# Patient Record
Sex: FEMALE | Race: OTHER | HISPANIC OR LATINO | URBAN - METROPOLITAN AREA
[De-identification: names, ages, dates, MRNs, and addresses within clinical notes are randomized per-mention and may not be internally consistent; named-entity substitution may affect disease eponyms.]

---

## 2022-07-10 ENCOUNTER — EMERGENCY (EMERGENCY)
Facility: HOSPITAL | Age: 22
LOS: 1 days | Discharge: STILL ADMITTED OR ADMINISTRATIVE | End: 2022-07-10
Admitting: EMERGENCY MEDICINE

## 2022-07-10 PROCEDURE — L9996: CPT

## 2022-07-11 ENCOUNTER — OUTPATIENT (OUTPATIENT)
Dept: INPATIENT UNIT | Facility: HOSPITAL | Age: 22
LOS: 1 days | Discharge: HOME | End: 2022-07-11

## 2022-07-11 VITALS
TEMPERATURE: 99 F | RESPIRATION RATE: 16 BRPM | DIASTOLIC BLOOD PRESSURE: 64 MMHG | HEART RATE: 93 BPM | SYSTOLIC BLOOD PRESSURE: 113 MMHG

## 2022-07-11 VITALS — SYSTOLIC BLOOD PRESSURE: 101 MMHG | DIASTOLIC BLOOD PRESSURE: 62 MMHG | HEART RATE: 76 BPM

## 2022-07-11 VITALS
OXYGEN SATURATION: 100 % | TEMPERATURE: 98 F | DIASTOLIC BLOOD PRESSURE: 69 MMHG | RESPIRATION RATE: 18 BRPM | SYSTOLIC BLOOD PRESSURE: 124 MMHG | HEART RATE: 99 BPM

## 2022-07-11 DIAGNOSIS — Z3A.00 WEEKS OF GESTATION OF PREGNANCY NOT SPECIFIED: ICD-10-CM

## 2022-07-11 DIAGNOSIS — O26.899 OTHER SPECIFIED PREGNANCY RELATED CONDITIONS, UNSPECIFIED TRIMESTER: ICD-10-CM

## 2022-07-11 DIAGNOSIS — O44.00 COMPLETE PLACENTA PREVIA NOS OR WITHOUT HEMORRHAGE, UNSPECIFIED TRIMESTER: ICD-10-CM

## 2022-07-11 LAB
APPEARANCE UR: ABNORMAL
BACTERIA # UR AUTO: ABNORMAL
BILIRUB UR-MCNC: NEGATIVE — SIGNIFICANT CHANGE UP
BLD GP AB SCN SERPL QL: NEGATIVE — SIGNIFICANT CHANGE UP
COLOR SPEC: SIGNIFICANT CHANGE UP
DIFF PNL FLD: ABNORMAL
EPI CELLS # UR: 5 /HPF — SIGNIFICANT CHANGE UP (ref 0–5)
GLUCOSE UR QL: NEGATIVE — SIGNIFICANT CHANGE UP
HYALINE CASTS # UR AUTO: 3 /LPF — SIGNIFICANT CHANGE UP (ref 0–7)
KETONES UR-MCNC: NEGATIVE — SIGNIFICANT CHANGE UP
LEUKOCYTE ESTERASE UR-ACNC: ABNORMAL
NITRITE UR-MCNC: NEGATIVE — SIGNIFICANT CHANGE UP
PH UR: 7 — SIGNIFICANT CHANGE UP (ref 5–8)
PROT UR-MCNC: ABNORMAL
RBC CASTS # UR COMP ASSIST: 7 /HPF — HIGH (ref 0–4)
RH IG SCN BLD-IMP: POSITIVE — SIGNIFICANT CHANGE UP
SP GR SPEC: 1.02 — SIGNIFICANT CHANGE UP (ref 1–1.05)
UROBILINOGEN FLD QL: SIGNIFICANT CHANGE UP
WBC UR QL: 49 /HPF — HIGH (ref 0–5)

## 2022-07-11 PROCEDURE — 76819 FETAL BIOPHYS PROFIL W/O NST: CPT | Mod: 26

## 2022-07-11 RX ORDER — FLUCONAZOLE 150 MG/1
1 TABLET ORAL
Qty: 1 | Refills: 0
Start: 2022-07-11 | End: 2022-07-11

## 2022-07-11 RX ORDER — NITROFURANTOIN MACROCRYSTAL 50 MG
1 CAPSULE ORAL
Qty: 10 | Refills: 0
Start: 2022-07-11 | End: 2022-07-15

## 2022-07-11 NOTE — OB PROVIDER TRIAGE NOTE - NSHPLABSRESULTS_GEN_ALL_CORE
Urinalysis Basic - ( 2022 01:25 )    Color: Light Yellow / Appearance: Slightly Turbid / S.021 / pH: x  Gluc: x / Ketone: Negative  / Bili: Negative / Urobili: <2 mg/dL   Blood: x / Protein: Trace / Nitrite: Negative   Leuk Esterase: Large / RBC: 7 /HPF / WBC 49 /HPF   Sq Epi: x / Non Sq Epi: 5 /HPF / Bacteria: Moderate    Blood type - O+

## 2022-07-11 NOTE — OB PROVIDER TRIAGE NOTE - PLAN OF CARE
d/w Dr Small: prescribe macrobid, prescribe diflucan x 1, ordered/sent type and screen - O+  follow up with OB provider as scheduled, Anatomy scan on Thursday.  Discussed bedrest and pelvic rest with pt - she verbalized understanding not to put anything in vagina.  reviewed  labor precautions and discharge papers- pt verbalized understanding and signed  increase fluid hydration  call OB provider/return to triage with concerns and or change in symptoms such as decreased FM, LOF, bleeding, contractions, pain d/w Dr Small: prescribe macrobid, prescribe diflucan x 1, ordered/sent type and screen - O+  follow up with OB provider as scheduled. Anatomy scan on Thursday.  Discussed bedrest and pelvic rest with pt - she verbalized understanding not to put anything in vagina.  reviewed  labor precautions and discharge papers- pt verbalized understanding and signed  increase fluid hydration  call OB provider/return to triage with concerns and or change in symptoms such as decreased FM, LOF, bleeding, contractions, pain

## 2022-07-11 NOTE — OB PROVIDER TRIAGE NOTE - HISTORY OF PRESENT ILLNESS
22 y/o female  at 20.3GA with SLIUP, PNC complicated by UTI in 2022, presents to triage c/o urinary bleeding and frequency since 23:00. She states she saw bright red blood in the toilet x 2 when she urinated and it is associated with low pelvic pressure. She explains she had similar symptoms 1 month ago and she presented to [facility] where she was diagnosed with UTI and given keflex.  Pt reports GFM. Pt denies LOF, VB, contractions, back pain, flank pain, recent intercourse.  Last PNC visit - 22, WNL  Pt scheduled for anatomy scan this week    med hx: denies  surg hx: denies   Ob: denies  Gyn: denies hx fibroids, cysts, abnormal pap smears, STDs, HSV  Allergies: NKDA  Medications: PNV, iron  Psych: denies  Social: denies alc/drugs/tobacco     20 y/o female  at 20.3GA with SLIUP, PNC complicated by UTI in 2022, presents to triage c/o urinary bleeding and frequency since 23:00. She states she saw bright red blood in the toilet x 2 when she urinated and it is associated with low pelvic pressure. She explains she had similar symptoms 1 month ago and she presented to [facility] where she was diagnosed with UTI and given keflex. - her symptoms resolved.  Pt reports GFM. Pt denies LOF, VB, contractions, back pain, flank pain, recent intercourse.  Last PNC visit - 22, WNL  Pt scheduled for anatomy scan this week    med hx: denies  surg hx: denies   Ob: denies  Gyn: denies hx fibroids, cysts, abnormal pap smears, STDs, HSV  Allergies: NKDA  Medications: PNV, iron  Psych: denies  Social: denies alc/drugs/tobacco     22 y/o female  at 20.3GA with SLIUP, PNC complicated by UTI in 2022, presents to triage c/o urinary bleeding and frequency since 23:00. She states she saw bright red blood in the toilet x 2 when she urinated and it is associated with low pelvic pressure. She explains she had similar symptoms 1 month ago and she presented to [facility] where she was diagnosed with UTI and given keflex - her symptoms resolved.  Pt reports GFM. Pt denies LOF, VB, contractions, back pain, flank pain, recent intercourse.  Last PNC visit - 22, WNL. Next PNC visit - 22  Pt scheduled for anatomy scan this week    med hx: denies  surg hx: denies   Ob: denies  Gyn: denies hx fibroids, cysts, abnormal pap smears, STDs, HSV  Allergies: NKDA  Medications: PNV, iron  Psych: denies  Social: denies alc/drugs/tobacco

## 2022-07-11 NOTE — OB PROVIDER TRIAGE NOTE - ADDITIONAL INSTRUCTIONS
follow up with OB provider as scheduled and Anatomy scan on Thursday.  macrobid x 5 days and diflucan x once sent to [facility] pharmacy  Bedrest and pelvic rest with pt until further discussed with OB provider- pt verbalized understanding.  reviewed  labor precautions  increase fluid hydration  call OB provider/return to triage with concerns and or change in symptoms such as decreased FM, LOF, bleeding, contractions, pain follow up with OB provider as scheduled and Anatomy scan on Thursday.  macrobid x 5 days and diflucan x once sent to [facility] pharmacy  Bedrest and pelvic rest with pt until further discussed with OB provider - Nothing in vagina until further discussed with OB provider.   reviewed  labor precautions  increase fluid hydration  call OB provider/return to triage with concerns and or change in symptoms such as decreased FM, LOF, bleeding, contractions, pain

## 2022-07-11 NOTE — OB PROVIDER TRIAGE NOTE - NSOBPROVIDERNOTE_OBGYN_ALL_OB_FT
22 y/o female  at 20.3GA with SLIUP, PNC complicated by UTI in 2022, presents to triage c/o urinary bleeding and frequency since 23:00. 22 y/o female  at 20.3GA with SLIUP, PNC complicated by UTI in 2022, presents to triage c/o urinary bleeding and frequency since 23:00.    PLAN: UA, TAS, SSE, SVE    TAS: GFM, , good fluid; Q2 - 2.85 x 3.73. placenta previa - performed by Dr Small and Jayshree MARRERO  SSE: performed by LORI MARRERO and Dr. Small. dark red blood, yeast-like discharge.  SVE: performed by Dr. Small. long/closed  TOCO: non-armando. mild irritability.    Urinalysis Basic - ( 2022 01:25 )    Color: Light Yellow / Appearance: Slightly Turbid / S.021 / pH: x  Gluc: x / Ketone: Negative  / Bili: Negative / Urobili: <2 mg/dL   Blood: x / Protein: Trace / Nitrite: Negative   Leuk Esterase: Large / RBC: 7 /HPF / WBC 49 /HPF   Sq Epi: x / Non Sq Epi: 5 /HPF / Bacteria: Moderate    ordered/sent Urine cx    Dr Small evaluated Pt and discussed with pt UTI, yeast infection, and placenta previa    d/w Dr Small: prescribe macrobid, prescribe diflucan x 1, ordered/sent type and screen - O+  follow up with OB provider as scheduled. Anatomy scan on Thursday.  Discussed bedrest and pelvic rest with pt - she verbalized understanding.  reviewed  labor precautions and discharge papers- pt verbalized understanding and signed  increase fluid hydration  call OB provider/return to triage with concerns and or change in symptoms such as decreased FM, LOF, bleeding, contractions, pain 22 y/o female  at 20.3GA with SLIUP, PNC complicated by UTI in 2022, presents to triage c/o urinary bleeding and frequency since 23:00.    PLAN: UA, TAS, SSE, SVE    TAS: GFM, , good fluid; Q2 - 2.85 x 3.73. placenta previa - performed by Dr Small and Jayshree MARRERO  SSE: performed by LORI MARRERO and Dr. Small. dark red blood, yeast-like discharge.  SVE: performed by Dr. Small. long/closed  TOCO: non-armando. mild irritability.    Urinalysis Basic - ( 2022 01:25 )    Color: Light Yellow / Appearance: Slightly Turbid / S.021 / pH: x  Gluc: x / Ketone: Negative  / Bili: Negative / Urobili: <2 mg/dL   Blood: x / Protein: Trace / Nitrite: Negative   Leuk Esterase: Large / RBC: 7 /HPF / WBC 49 /HPF   Sq Epi: x / Non Sq Epi: 5 /HPF / Bacteria: Moderate    ordered/sent Urine cx    Dr Small evaluated Pt and discussed with pt UTI, yeast infection, and placenta previa    d/w Dr Small: prescribe macrobid, prescribe diflucan x 1, ordered/sent type and screen - O+  follow up with OB provider as scheduled. Anatomy scan on Thursday.  Discussed bedrest and pelvic rest with pt - she verbalized understanding not to put anything in vagina.  reviewed  labor precautions and discharge papers- pt verbalized understanding and signed  increase fluid hydration  call OB provider/return to triage with concerns and or change in symptoms such as decreased FM, LOF, bleeding, contractions, pain

## 2022-07-11 NOTE — OB RN TRIAGE NOTE - CHIEF COMPLAINT QUOTE
"I am having blood in my urine when I go to the bathroom and increased urge to urinate and painful urination"

## 2022-07-11 NOTE — OB PROVIDER TRIAGE NOTE - NSHPPHYSICALEXAM_GEN_ALL_CORE
Vital Signs Last 24 Hrs  T(C): 37 (11 Jul 2022 00:44), Max: 37.0 (11 Jul 2022 00:42)  T(F): 98.6 (11 Jul 2022 00:44), Max: 98.6 (11 Jul 2022 00:42)  HR: 93 (11 Jul 2022 00:44) (93 - 99)  BP: 113/64 (11 Jul 2022 00:44) (113/64 - 124/69)  BP(mean): --  RR: 16 (11 Jul 2022 00:44) (16 - 18)  SpO2: 100% (11 Jul 2022 00:03) (100% - 100%)    gen: a/o x 3, comfortable  pulm: CTA b/l  cardio: RRR  abd: soft and mildy tender at low pelvic area, gravid  SSE: performed by Dr. Small. dark red blood, yeast-like discharge.  SVE: performed by Dr. Small. closed/long  TAS: GFM, , good fluid. Q2 - 2.85 x 3.73. placenta previa - performed by Dr Small and Jayshree MARRERO  EFM: deferred  TOCO: non-armando. mild irritability. Vital Signs Last 24 Hrs  T(C): 37 (11 Jul 2022 00:44), Max: 37.0 (11 Jul 2022 00:42)  T(F): 98.6 (11 Jul 2022 00:44), Max: 98.6 (11 Jul 2022 00:42)  HR: 93 (11 Jul 2022 00:44) (93 - 99)  BP: 113/64 (11 Jul 2022 00:44) (113/64 - 124/69)  BP(mean): --  RR: 16 (11 Jul 2022 00:44) (16 - 18)  SpO2: 100% (11 Jul 2022 00:03) (100% - 100%)    gen: a/o x 3, comfortable  pulm: CTA b/l  cardio: RRR  abd: soft and mildy tender at low pelvic area, gravid  SSE: performed by LORI MARRERO and Dr. Small. dark red/brown blood, yeast-like discharge.  SVE: performed by Dr. Small. closed/long  TAS: GFM, , good fluid; Q2 - 2.85 x 3.73. placenta previa - performed by Dr Small and Jayshree MARRERO  EFM: deferred  TOCO: non-armando. mild irritability.

## 2022-07-12 LAB
CULTURE RESULTS: SIGNIFICANT CHANGE UP
SPECIMEN SOURCE: SIGNIFICANT CHANGE UP

## 2022-11-10 ENCOUNTER — OUTPATIENT (OUTPATIENT)
Dept: INPATIENT UNIT | Facility: HOSPITAL | Age: 22
LOS: 1 days | Discharge: HOME | End: 2022-11-10

## 2022-11-10 VITALS
DIASTOLIC BLOOD PRESSURE: 77 MMHG | RESPIRATION RATE: 17 BRPM | TEMPERATURE: 98 F | HEART RATE: 95 BPM | SYSTOLIC BLOOD PRESSURE: 139 MMHG

## 2022-11-10 VITALS — DIASTOLIC BLOOD PRESSURE: 63 MMHG | HEART RATE: 85 BPM | SYSTOLIC BLOOD PRESSURE: 129 MMHG

## 2022-11-10 DIAGNOSIS — Z3A.00 WEEKS OF GESTATION OF PREGNANCY NOT SPECIFIED: ICD-10-CM

## 2022-11-10 DIAGNOSIS — O26.899 OTHER SPECIFIED PREGNANCY RELATED CONDITIONS, UNSPECIFIED TRIMESTER: ICD-10-CM

## 2022-11-10 PROCEDURE — 76818 FETAL BIOPHYS PROFILE W/NST: CPT | Mod: 26

## 2022-11-10 PROCEDURE — 99214 OFFICE O/P EST MOD 30 MIN: CPT

## 2022-11-10 NOTE — OB PROVIDER TRIAGE NOTE - HISTORY OF PRESENT ILLNESS
20y/o  @37.5wks presents with contractions since seeing Dr. Cherry this afternoon for a prenatal appointment, Pain scale 5/10  Reports good fetal movement  Denies LOF/VB    Allergies: Denies  Medications: PNV    Denies Medical and Surgical HX  Denies Etoh/Smoke/Drugs/Psy

## 2022-11-10 NOTE — OB PROVIDER TRIAGE NOTE - NSHPPHYSICALEXAM_GEN_ALL_CORE
Vital Signs Last 24 Hrs  T(C): 37.0 (10 Nov 2022 22:20), Max: 37.0 (10 Nov 2022 22:20)  T(F): 98.6 (10 Nov 2022 22:20), Max: 98.6 (10 Nov 2022 22:20)  HR: 86 (10 Nov 2022 22:23) (86 - 95)  BP: 121/65 (10 Nov 2022 22:23) (121/65 - 139/77)  RR: 17 (10 Nov 2022 21:55) (17 - 17)    Assessment reveals VSS  Abdomen soft, NT, gravid  Cat 1 tracing, irregular 3-7mins  Transabdominal Ultrasound- vtx, post placenta, bpp8/8, isabel: 15.37  Vaginal Exam- 0/30/-3  A&Ox3  Lungs- clear bilateral  Heart- normal rate and regular rhythm  Extremities- Warm, Dry, no edema present, good pulses       PLAN:  D/C Home

## 2022-11-10 NOTE — OB RN TRIAGE NOTE - FALL HARM RISK - UNIVERSAL INTERVENTIONS
Bed in lowest position, wheels locked, appropriate side rails in place/Call bell, personal items and telephone in reach/Instruct patient to call for assistance before getting out of bed or chair/Non-slip footwear when patient is out of bed/[place] to call system/Physically safe environment - no spills, clutter or unnecessary equipment/Purposeful Proactive Rounding/Room/bathroom lighting operational, light cord in reach

## 2022-11-10 NOTE — OB PROVIDER TRIAGE NOTE - ADDITIONAL INSTRUCTIONS
Follow up at next scheduled prenatal appointment 11/17/2022  Return for decreased fetal movement, loss of fluid or vaginal bleeding  Increase oral hydration  Signs and Symptoms of Labor Review

## 2022-11-10 NOTE — OB PROVIDER TRIAGE NOTE - PLAN OF CARE
D/C Home  D/W    No evidence of acute process  Normal fetal Testing  False Labor at 37 weeks  Follow up at next scheduled prenatal appointment 11/17/2022  Return for decreased fetal movement, loss of fluid or vaginal bleeding  Increase oral hydration  Signs and Symptoms of Labor Review D/C Home  D/W Dr. Willis  No evidence of acute process  Normal fetal Testing  False Labor at 37 weeks  Follow up at next scheduled prenatal appointment 11/17/2022  Return for decreased fetal movement, loss of fluid or vaginal bleeding  Increase oral hydration  Signs and Symptoms of Labor Review

## 2022-11-11 PROBLEM — Z78.9 OTHER SPECIFIED HEALTH STATUS: Chronic | Status: ACTIVE | Noted: 2022-07-11

## 2022-11-22 PROBLEM — Z00.00 ENCOUNTER FOR PREVENTIVE HEALTH EXAMINATION: Status: ACTIVE | Noted: 2022-11-22

## 2022-11-23 ENCOUNTER — APPOINTMENT (OUTPATIENT)
Dept: ANTEPARTUM | Facility: CLINIC | Age: 22
End: 2022-11-23

## 2022-11-23 ENCOUNTER — ASOB RESULT (OUTPATIENT)
Age: 22
End: 2022-11-23

## 2022-11-23 PROCEDURE — 76819 FETAL BIOPHYS PROFIL W/O NST: CPT | Mod: 59

## 2022-11-23 PROCEDURE — 76816 OB US FOLLOW-UP PER FETUS: CPT

## 2022-11-23 PROCEDURE — ZZZZZ: CPT

## 2022-11-28 ENCOUNTER — APPOINTMENT (OUTPATIENT)
Dept: ANTEPARTUM | Facility: CLINIC | Age: 22
End: 2022-11-28

## 2022-11-28 ENCOUNTER — ASOB RESULT (OUTPATIENT)
Age: 22
End: 2022-11-28

## 2022-11-28 PROCEDURE — 76818 FETAL BIOPHYS PROFILE W/NST: CPT

## 2022-11-30 ENCOUNTER — APPOINTMENT (OUTPATIENT)
Dept: ANTEPARTUM | Facility: CLINIC | Age: 22
End: 2022-11-30

## 2022-11-30 ENCOUNTER — INPATIENT (INPATIENT)
Facility: HOSPITAL | Age: 22
LOS: 1 days | Discharge: HOME | End: 2022-12-02
Attending: OBSTETRICS & GYNECOLOGY | Admitting: OBSTETRICS & GYNECOLOGY

## 2022-11-30 VITALS
DIASTOLIC BLOOD PRESSURE: 69 MMHG | TEMPERATURE: 98 F | RESPIRATION RATE: 15 BRPM | HEART RATE: 82 BPM | SYSTOLIC BLOOD PRESSURE: 126 MMHG

## 2022-11-30 DIAGNOSIS — Z3A.00 WEEKS OF GESTATION OF PREGNANCY NOT SPECIFIED: ICD-10-CM

## 2022-11-30 DIAGNOSIS — O26.899 OTHER SPECIFIED PREGNANCY RELATED CONDITIONS, UNSPECIFIED TRIMESTER: ICD-10-CM

## 2022-11-30 LAB
BASOPHILS # BLD AUTO: 0.07 K/UL — SIGNIFICANT CHANGE UP (ref 0–0.2)
BASOPHILS NFR BLD AUTO: 0.5 % — SIGNIFICANT CHANGE UP (ref 0–2)
BLD GP AB SCN SERPL QL: NEGATIVE — SIGNIFICANT CHANGE UP
COVID-19 SPIKE DOMAIN AB INTERP: POSITIVE
COVID-19 SPIKE DOMAIN ANTIBODY RESULT: >250 U/ML — HIGH
EOSINOPHIL # BLD AUTO: 0.12 K/UL — SIGNIFICANT CHANGE UP (ref 0–0.5)
EOSINOPHIL NFR BLD AUTO: 0.8 % — SIGNIFICANT CHANGE UP (ref 0–6)
HCT VFR BLD CALC: 40 % — SIGNIFICANT CHANGE UP (ref 34.5–45)
HGB BLD-MCNC: 13.2 G/DL — SIGNIFICANT CHANGE UP (ref 11.5–15.5)
IANC: 11.55 K/UL — HIGH (ref 1.8–7.4)
IMM GRANULOCYTES NFR BLD AUTO: 2.5 % — HIGH (ref 0–0.9)
LYMPHOCYTES # BLD AUTO: 14.7 % — SIGNIFICANT CHANGE UP (ref 13–44)
LYMPHOCYTES # BLD AUTO: 2.27 K/UL — SIGNIFICANT CHANGE UP (ref 1–3.3)
MCHC RBC-ENTMCNC: 30.5 PG — SIGNIFICANT CHANGE UP (ref 27–34)
MCHC RBC-ENTMCNC: 33 GM/DL — SIGNIFICANT CHANGE UP (ref 32–36)
MCV RBC AUTO: 92.4 FL — SIGNIFICANT CHANGE UP (ref 80–100)
MONOCYTES # BLD AUTO: 1.06 K/UL — HIGH (ref 0–0.9)
MONOCYTES NFR BLD AUTO: 6.9 % — SIGNIFICANT CHANGE UP (ref 2–14)
NEUTROPHILS # BLD AUTO: 11.55 K/UL — HIGH (ref 1.8–7.4)
NEUTROPHILS NFR BLD AUTO: 74.6 % — SIGNIFICANT CHANGE UP (ref 43–77)
NRBC # BLD: 0 /100 WBCS — SIGNIFICANT CHANGE UP (ref 0–0)
NRBC # FLD: 0 K/UL — SIGNIFICANT CHANGE UP (ref 0–0)
PLATELET # BLD AUTO: 142 K/UL — LOW (ref 150–400)
RBC # BLD: 4.33 M/UL — SIGNIFICANT CHANGE UP (ref 3.8–5.2)
RBC # FLD: 13.5 % — SIGNIFICANT CHANGE UP (ref 10.3–14.5)
RH IG SCN BLD-IMP: POSITIVE — SIGNIFICANT CHANGE UP
SARS-COV-2 IGG+IGM SERPL QL IA: >250 U/ML — HIGH
SARS-COV-2 IGG+IGM SERPL QL IA: POSITIVE
SARS-COV-2 RNA SPEC QL NAA+PROBE: SIGNIFICANT CHANGE UP
T PALLIDUM AB TITR SER: NEGATIVE — SIGNIFICANT CHANGE UP
WBC # BLD: 15.46 K/UL — HIGH (ref 3.8–10.5)
WBC # FLD AUTO: 15.46 K/UL — HIGH (ref 3.8–10.5)

## 2022-11-30 PROCEDURE — 76815 OB US LIMITED FETUS(S): CPT | Mod: 26

## 2022-11-30 RX ORDER — AMPICILLIN TRIHYDRATE 250 MG
2 CAPSULE ORAL ONCE
Refills: 0 | Status: COMPLETED | OUTPATIENT
Start: 2022-11-30 | End: 2022-11-30

## 2022-11-30 RX ORDER — OXYTOCIN 10 UNIT/ML
333.33 VIAL (ML) INJECTION
Qty: 20 | Refills: 0 | Status: DISCONTINUED | OUTPATIENT
Start: 2022-11-30 | End: 2022-11-30

## 2022-11-30 RX ORDER — SODIUM CHLORIDE 9 MG/ML
1000 INJECTION INTRAMUSCULAR; INTRAVENOUS; SUBCUTANEOUS
Refills: 0 | Status: DISCONTINUED | OUTPATIENT
Start: 2022-11-30 | End: 2022-12-01

## 2022-11-30 RX ORDER — OXYTOCIN 10 UNIT/ML
2 VIAL (ML) INJECTION
Qty: 30 | Refills: 0 | Status: DISCONTINUED | OUTPATIENT
Start: 2022-11-30 | End: 2022-12-01

## 2022-11-30 RX ORDER — OXYCODONE HYDROCHLORIDE 5 MG/1
5 TABLET ORAL
Refills: 0 | Status: DISCONTINUED | OUTPATIENT
Start: 2022-11-30 | End: 2022-12-02

## 2022-11-30 RX ORDER — SODIUM CHLORIDE 9 MG/ML
3 INJECTION INTRAMUSCULAR; INTRAVENOUS; SUBCUTANEOUS EVERY 8 HOURS
Refills: 0 | Status: DISCONTINUED | OUTPATIENT
Start: 2022-11-30 | End: 2022-12-02

## 2022-11-30 RX ORDER — INFLUENZA VIRUS VACCINE 15; 15; 15; 15 UG/.5ML; UG/.5ML; UG/.5ML; UG/.5ML
0.5 SUSPENSION INTRAMUSCULAR ONCE
Refills: 0 | Status: COMPLETED | OUTPATIENT
Start: 2022-11-30 | End: 2022-12-01

## 2022-11-30 RX ORDER — SODIUM CHLORIDE 9 MG/ML
300 INJECTION INTRAMUSCULAR; INTRAVENOUS; SUBCUTANEOUS ONCE
Refills: 0 | Status: COMPLETED | OUTPATIENT
Start: 2022-11-30 | End: 2022-11-30

## 2022-11-30 RX ORDER — DIBUCAINE 1 %
1 OINTMENT (GRAM) RECTAL EVERY 6 HOURS
Refills: 0 | Status: DISCONTINUED | OUTPATIENT
Start: 2022-11-30 | End: 2022-12-02

## 2022-11-30 RX ORDER — OXYCODONE HYDROCHLORIDE 5 MG/1
5 TABLET ORAL ONCE
Refills: 0 | Status: DISCONTINUED | OUTPATIENT
Start: 2022-11-30 | End: 2022-12-02

## 2022-11-30 RX ORDER — CHLORHEXIDINE GLUCONATE 213 G/1000ML
1 SOLUTION TOPICAL ONCE
Refills: 0 | Status: DISCONTINUED | OUTPATIENT
Start: 2022-11-30 | End: 2022-11-30

## 2022-11-30 RX ORDER — IBUPROFEN 200 MG
600 TABLET ORAL EVERY 6 HOURS
Refills: 0 | Status: COMPLETED | OUTPATIENT
Start: 2022-11-30 | End: 2023-10-29

## 2022-11-30 RX ORDER — OXYTOCIN 10 UNIT/ML
41.67 VIAL (ML) INJECTION
Qty: 20 | Refills: 0 | Status: DISCONTINUED | OUTPATIENT
Start: 2022-11-30 | End: 2022-12-01

## 2022-11-30 RX ORDER — KETOROLAC TROMETHAMINE 30 MG/ML
30 SYRINGE (ML) INJECTION ONCE
Refills: 0 | Status: DISCONTINUED | OUTPATIENT
Start: 2022-11-30 | End: 2022-11-30

## 2022-11-30 RX ORDER — PRAMOXINE HYDROCHLORIDE 150 MG/15G
1 AEROSOL, FOAM RECTAL EVERY 4 HOURS
Refills: 0 | Status: DISCONTINUED | OUTPATIENT
Start: 2022-11-30 | End: 2022-12-02

## 2022-11-30 RX ORDER — DIPHENHYDRAMINE HCL 50 MG
25 CAPSULE ORAL EVERY 6 HOURS
Refills: 0 | Status: DISCONTINUED | OUTPATIENT
Start: 2022-11-30 | End: 2022-12-02

## 2022-11-30 RX ORDER — TETANUS TOXOID, REDUCED DIPHTHERIA TOXOID AND ACELLULAR PERTUSSIS VACCINE, ADSORBED 5; 2.5; 8; 8; 2.5 [IU]/.5ML; [IU]/.5ML; UG/.5ML; UG/.5ML; UG/.5ML
0.5 SUSPENSION INTRAMUSCULAR ONCE
Refills: 0 | Status: COMPLETED | OUTPATIENT
Start: 2022-11-30

## 2022-11-30 RX ORDER — HYDROCORTISONE 1 %
1 OINTMENT (GRAM) TOPICAL EVERY 6 HOURS
Refills: 0 | Status: DISCONTINUED | OUTPATIENT
Start: 2022-11-30 | End: 2022-12-02

## 2022-11-30 RX ORDER — BENZOCAINE 10 %
1 GEL (GRAM) MUCOUS MEMBRANE EVERY 6 HOURS
Refills: 0 | Status: DISCONTINUED | OUTPATIENT
Start: 2022-11-30 | End: 2022-12-02

## 2022-11-30 RX ORDER — AMPICILLIN TRIHYDRATE 250 MG
1 CAPSULE ORAL EVERY 4 HOURS
Refills: 0 | Status: DISCONTINUED | OUTPATIENT
Start: 2022-11-30 | End: 2022-11-30

## 2022-11-30 RX ORDER — SIMETHICONE 80 MG/1
80 TABLET, CHEWABLE ORAL EVERY 4 HOURS
Refills: 0 | Status: DISCONTINUED | OUTPATIENT
Start: 2022-11-30 | End: 2022-12-02

## 2022-11-30 RX ORDER — MAGNESIUM HYDROXIDE 400 MG/1
30 TABLET, CHEWABLE ORAL
Refills: 0 | Status: DISCONTINUED | OUTPATIENT
Start: 2022-11-30 | End: 2022-12-02

## 2022-11-30 RX ORDER — AMPICILLIN TRIHYDRATE 250 MG
CAPSULE ORAL
Refills: 0 | Status: DISCONTINUED | OUTPATIENT
Start: 2022-11-30 | End: 2022-11-30

## 2022-11-30 RX ORDER — ACETAMINOPHEN 500 MG
975 TABLET ORAL
Refills: 0 | Status: DISCONTINUED | OUTPATIENT
Start: 2022-11-30 | End: 2022-12-02

## 2022-11-30 RX ORDER — AER TRAVELER 0.5 G/1
1 SOLUTION RECTAL; TOPICAL EVERY 4 HOURS
Refills: 0 | Status: DISCONTINUED | OUTPATIENT
Start: 2022-11-30 | End: 2022-12-02

## 2022-11-30 RX ORDER — SODIUM CHLORIDE 9 MG/ML
1000 INJECTION, SOLUTION INTRAVENOUS
Refills: 0 | Status: DISCONTINUED | OUTPATIENT
Start: 2022-11-30 | End: 2022-11-30

## 2022-11-30 RX ORDER — LANOLIN
1 OINTMENT (GRAM) TOPICAL EVERY 6 HOURS
Refills: 0 | Status: DISCONTINUED | OUTPATIENT
Start: 2022-11-30 | End: 2022-12-02

## 2022-11-30 RX ORDER — CITRIC ACID/SODIUM CITRATE 300-500 MG
15 SOLUTION, ORAL ORAL EVERY 6 HOURS
Refills: 0 | Status: DISCONTINUED | OUTPATIENT
Start: 2022-11-30 | End: 2022-11-30

## 2022-11-30 RX ADMIN — Medication 125 MILLIUNIT(S)/MIN: at 19:17

## 2022-11-30 RX ADMIN — Medication 975 MILLIGRAM(S): at 23:37

## 2022-11-30 RX ADMIN — Medication 100 GRAM(S): at 04:45

## 2022-11-30 RX ADMIN — Medication 108 GRAM(S): at 08:45

## 2022-11-30 RX ADMIN — Medication 2 MILLIUNIT(S)/MIN: at 09:35

## 2022-11-30 RX ADMIN — SODIUM CHLORIDE 300 MILLILITER(S): 9 INJECTION INTRAMUSCULAR; INTRAVENOUS; SUBCUTANEOUS at 12:19

## 2022-11-30 RX ADMIN — Medication 108 GRAM(S): at 12:45

## 2022-11-30 RX ADMIN — SODIUM CHLORIDE 3 MILLILITER(S): 9 INJECTION INTRAMUSCULAR; INTRAVENOUS; SUBCUTANEOUS at 22:43

## 2022-11-30 RX ADMIN — Medication 108 GRAM(S): at 15:57

## 2022-11-30 RX ADMIN — Medication 30 MILLIGRAM(S): at 20:40

## 2022-11-30 RX ADMIN — SODIUM CHLORIDE 125 MILLILITER(S): 9 INJECTION, SOLUTION INTRAVENOUS at 07:36

## 2022-11-30 RX ADMIN — SODIUM CHLORIDE 125 MILLILITER(S): 9 INJECTION INTRAMUSCULAR; INTRAVENOUS; SUBCUTANEOUS at 13:20

## 2022-11-30 NOTE — OB PROVIDER H&P - PROBLEM SELECTOR PLAN 1
Admit for Labor  D/W Dr. Whitman  Routine Orders  Covid Swabbed  Epidural for pain management   GBS negative as per patient   Possible Augmentation Admit for Labor  D/W Dr. Whitman  Routine Orders  Covid Swabbed  Epidural for pain management   GBS Positive    Possible Augmentation

## 2022-11-30 NOTE — OB PROVIDER DELIVERY SUMMARY - NSPROVIDERDELIVERYNOTE_OBGYN_ALL_OB_FT
Midline episiotomy was cut. Spontaneous vaginal delivery of liveborn infant from MARY ANN position. Head, shoulders, and body delivered easily. Infant was suctioned. No mec. Delayed cord clamping. Cord gases obtained. Placenta delivered intact. Fundal massage was given and uterine fundus was found to be firm. Vaginal exam revealed an intact cervix and sulci. Midline episiotomy was repaired with 2.0 chromic suture. Patient had L periurethral tear that was repaired with 3.0 chromic suture. Excellent hemostasis was noted. Patient was stable. Count was correct x 2.

## 2022-11-30 NOTE — OB RN PATIENT PROFILE - FALL HARM RISK - UNIVERSAL INTERVENTIONS
Bed in lowest position, wheels locked, appropriate side rails in place/Call bell, personal items and telephone in reach/Instruct patient to call for assistance before getting out of bed or chair/Non-slip footwear when patient is out of bed/[place] to call system/Physically safe environment - no spills, clutter or unnecessary equipment/Purposeful Proactive Rounding/Room/bathroom lighting operational, light cord in reach

## 2022-11-30 NOTE — OB RN DELIVERY SUMMARY - NSSELHIDDEN_OBGYN_ALL_OB_FT
[NS_DeliveryAttending1_OBGYN_ALL_OB_FT:[id number]],[NS_DeliveryAssist1_OBGYN_ALL_OB_FT:[id number]],[NS_DeliveryRN_OBGYN_ALL_OB_FT:[id number]],[NS_CirculateRN2_OBGYN_ALL_OB_FT:[id number]]

## 2022-11-30 NOTE — OB PROVIDER DELIVERY SUMMARY - NSSELHIDDEN_OBGYN_ALL_OB_FT
[NS_DeliveryAttending1_OBGYN_ALL_OB_FT:[id number]],[NS_DeliveryAssist1_OBGYN_ALL_OB_FT:[id number]]

## 2022-11-30 NOTE — OB PROVIDER H&P - NS_PRENATALLABSOURCEGBS36PN_OBGYN_ALL_OB
Per Dr Samantha Leyva increase lasix to 40mg x3 days and get portable chest xray. Home care nurse notified, order faxed. positive

## 2022-11-30 NOTE — OB RN DELIVERY SUMMARY - NS_SEPSISRSKCALC_OBGYN_ALL_OB_FT
EOS calculated successfully. EOS Risk Factor: 0.5/1000 live births ([facility] national incidence); GA=40w4d; Temp=98.42; ROM=12.25; GBS='Positive'; Antibiotics='GBS specific antibiotics > 2 hrs prior to birth'

## 2022-11-30 NOTE — OB PROVIDER LABOR PROGRESS NOTE - ASSESSMENT
as per Dr Brenner, Pitocin augmentation was initiated; patient and family explained the plan of care; will reassess in 2 hours ar prn.

## 2022-11-30 NOTE — OB PROVIDER H&P - NS ATTEND AMEND GEN_ALL_CORE FT
OB Attending   P0 at term admitted in early labor  -fetal status reassuring  -augmentation and epidural PRN    N Sample-MD Earl

## 2022-11-30 NOTE — OB PROVIDER TRIAGE NOTE - NSHPPHYSICALEXAM_GEN_ALL_CORE
Vital Signs Last 24 Hrs  T(C): 37.0 (10 Nov 2022 22:20), Max: 37.0 (10 Nov 2022 22:20)  T(F): 98.6 (10 Nov 2022 22:20), Max: 98.6 (10 Nov 2022 22:20)  HR: 86 (10 Nov 2022 22:23) (86 - 95)  BP: 121/65 (10 Nov 2022 22:23) (121/65 - 139/77)  RR: 17 (10 Nov 2022 21:55) (17 - 17)    Assessment reveals VSS  Abdomen soft, NT, gravid  Cat 1 tracing, irregular 3-7mins  Transabdominal Ultrasound- vtx, post placenta, bpp8/8, isabel: 15.37  Vaginal Exam- 2.5/50/-3  A&Ox3  Lungs- clear bilateral  Heart- normal rate and regular rhythm  Extremities- Warm, Dry, no edema present, good pulses       PLAN:

## 2022-11-30 NOTE — OB RN DELIVERY SUMMARY - BABY A: APGAR 1 MIN SCORE, DELIVERY
8 Hemigard Postcare Instructions: The HEMIGARD strips are to remain completely dry for at least 5-7 days.

## 2022-11-30 NOTE — PROGRESS NOTE ADULT - SUBJECTIVE AND OBJECTIVE BOX
VE-5/90/-2  difficulty picking up fhr and uterine contractions  ISE and IUPC inserted  Variable decels noted  Pitocvin d/andre  amnioinfusion started

## 2022-11-30 NOTE — OB PROVIDER LABOR PROGRESS NOTE - ASSESSMENT
incomplete note Patient with cervical change  Continuous EFM/toco  Continue expectant management  Repeat VE in 2 hours    Discussed with Dr. Whitman, Service Attending  LIZZY Jacobo PGY1

## 2022-11-30 NOTE — OB RN DELIVERY SUMMARY - BABY A: WEIGHT IN POUNDS (FROM GRAMS), DELIVERY
Yina OSCAR of [facility] team notified of pt extensor posturing in Rt upper arm. Will continue to monitor   8

## 2022-11-30 NOTE — OB PROVIDER DELIVERY SUMMARY - NSLOWPPHRISK_OBGYN_A_OB
No previous uterine incision/[place] Pregnancy/Less than or equal to 4 previous vaginal births/No known bleeding disorder/No history of postpartum hemorrhage/No other PPH risks indicated

## 2022-11-30 NOTE — OB PROVIDER H&P - HISTORY OF PRESENT ILLNESS
20y/o  @40.4wks presents with painful contractions that have increased with intensity  pain scale 6/10.   Reports good fetal movement  Denies LOF/VB    Allergies: Denies  Medications: PNV    Denies Medical and Surgical HX  Denies Etoh/Smoke/Drugs/Psy

## 2022-11-30 NOTE — OB RN TRIAGE NOTE - FALL HARM RISK - UNIVERSAL INTERVENTIONS
Bed in lowest position, wheels locked, appropriate side rails in place/Call bell, personal items and telephone in reach/Instruct patient to call for assistance before getting out of bed or chair/Non-slip footwear when patient is out of bed/[place] to call system/Physically safe environment - no spills, clutter or unnecessary equipment/Purposeful Proactive Rounding/Room/bathroom lighting operational, light cord in reach

## 2022-11-30 NOTE — OB PROVIDER TRIAGE NOTE - HISTORY OF PRESENT ILLNESS
22y/o  @40.4wks presents with contractions pain for the past 3 hours, pain scale 5/10  Reports good fetal movement  Denies LOF/VB    Allergies: Denies  Medications: PNV    Denies Medical and Surgical HX  Denies Etoh/Smoke/Drugs/Psy

## 2022-11-30 NOTE — OB PROVIDER LABOR PROGRESS NOTE - NS_SUBJECTIVE/OBJECTIVE_OBGYN_ALL_OB_FT
Patient was reevaluated by Dr Alicea for labor progress  s/p effective epidural; GBS+ status  admitted due to SROM
PGY1 Labor & Delivery Progress Note (Entry delayed secondary to clinical duties)     Pt examined @ 1540 to eval for cervical change    T(C): 36.9 (11-30-22 @ 17:00), Max: 36.9 (11-30-22 @ 17:00)  HR: 104 (11-30-22 @ 18:19) (69 - 125)  BP: 142/63 (11-30-22 @ 17:56) (107/56 - 178/71)  RR: 16 (11-30-22 @ 04:46) (15 - 16)  SpO2: 99% (11-30-22 @ 18:19) (89% - 100%)
Patient examined for cervical change  Per RN patient with SROM@5:35am, clear fluid

## 2022-11-30 NOTE — OB PROVIDER H&P - PRETERM DELIVERIES, OB PROFILE
[Time Spent: ___ minutes] : I have spent [unfilled] minutes of time on the encounter. [>50% of the face to face encounter time was spent on counseling and/or coordination of care for ___] : Greater than 50% of the face to face encounter time was spent on counseling and/or coordination of care for [unfilled] 0

## 2022-11-30 NOTE — OB PROVIDER LABOR PROGRESS NOTE - ASSESSMENT
A/P:   21y  @ 40.4wga admitted in early labor     #Labor   - On Oxytocin  - Shortly after exam, there was a prolonged bradycardia of ~5min that improved w/: bolusing, repositioning, and turning off of Oxytocin. Tracing improved thereafter to baseline w/ moderate variability   - ISE was replaced given concerns for possible disruption of ISE with exam  - Will allow tracing to recover and proceed to push     #Pain Control   - w/ Epi    Tyler Gimenez, PGY-1    seen and evaluated w/ Dr. Alicea

## 2022-11-30 NOTE — OB NEONATOLOGY/PEDIATRICIAN DELIVERY SUMMARY - NSPEDSNEONOTESA_OBGYN_ALL_OB_FT
Peds called to LDR for Category 2 tracing. 40wk4d male born via  to a 20 y/o  mother.  Prenatal history of resolved low lying placenta. Maternal labs include Blood Type O+, HIV - , RPR NR , Rubella I , Hep B - , GBS + (11/10), s/p amp x 4, COVID - (). SROM at 0536 () with clear fluids. Baby emerged vigorous, crying, was warmed, dried suctioned and stimulated with APGARS of 8/8. Mom plans to initiate breastfeeding, consents Hep B vaccine and consents circ.  Highest maternal temp: 36.9. EOS 0.09.    Physical Exam:  Gen: no acute distress, +grimace  HEENT: anterior fontanel open soft and flat, nondysmoprhic facies, no cleft lip/palate, ears normal set, no ear pits or tags, nares clinically patent  Resp: Normal respiratory effort without grunting or retractions, good air entry b/l, clear to auscultation bilaterally  Cardio: Present S1/S2, regular rate and rhythm, no murmurs  Abd: soft, non tender, non distended, umbilical cord with 3 vessels  Neuro: +palmar and plantar grasp, +suck, +kirit, normal tone  Extremities: negative britton and ortolani maneuvers, moving all extremities, no clavicular crepitus or stepoff  Skin: pink, warm  Genitals: + curved raphe, testicles palpable in scrotum b/l, Tu 1, anus patent

## 2022-12-01 ENCOUNTER — TRANSCRIPTION ENCOUNTER (OUTPATIENT)
Age: 22
End: 2022-12-01

## 2022-12-01 LAB
BASOPHILS # BLD AUTO: 0.12 K/UL — SIGNIFICANT CHANGE UP (ref 0–0.2)
BASOPHILS NFR BLD AUTO: 0.7 % — SIGNIFICANT CHANGE UP (ref 0–2)
EOSINOPHIL # BLD AUTO: 0.07 K/UL — SIGNIFICANT CHANGE UP (ref 0–0.5)
EOSINOPHIL NFR BLD AUTO: 0.4 % — SIGNIFICANT CHANGE UP (ref 0–6)
HCT VFR BLD CALC: 37.3 % — SIGNIFICANT CHANGE UP (ref 34.5–45)
HCT VFR BLD CALC: 37.3 % — SIGNIFICANT CHANGE UP (ref 34.5–45)
HGB BLD-MCNC: 11.9 G/DL — SIGNIFICANT CHANGE UP (ref 11.5–15.5)
HGB BLD-MCNC: 11.9 G/DL — SIGNIFICANT CHANGE UP (ref 11.5–15.5)
IANC: 13.54 K/UL — HIGH (ref 1.8–7.4)
IMM GRANULOCYTES NFR BLD AUTO: 1.5 % — HIGH (ref 0–0.9)
LYMPHOCYTES # BLD AUTO: 1.83 K/UL — SIGNIFICANT CHANGE UP (ref 1–3.3)
LYMPHOCYTES # BLD AUTO: 10.5 % — LOW (ref 13–44)
MCHC RBC-ENTMCNC: 30.4 PG — SIGNIFICANT CHANGE UP (ref 27–34)
MCHC RBC-ENTMCNC: 31.9 GM/DL — LOW (ref 32–36)
MCV RBC AUTO: 96.4 FL — SIGNIFICANT CHANGE UP (ref 80–100)
MONOCYTES # BLD AUTO: 1.54 K/UL — HIGH (ref 0–0.9)
MONOCYTES NFR BLD AUTO: 8.9 % — SIGNIFICANT CHANGE UP (ref 2–14)
NEUTROPHILS # BLD AUTO: 13.54 K/UL — HIGH (ref 1.8–7.4)
NEUTROPHILS NFR BLD AUTO: 78 % — HIGH (ref 43–77)
NRBC # BLD: 0 /100 WBCS — SIGNIFICANT CHANGE UP (ref 0–0)
NRBC # FLD: 0 K/UL — SIGNIFICANT CHANGE UP (ref 0–0)
PLATELET # BLD AUTO: 121 K/UL — LOW (ref 150–400)
RBC # BLD: 3.92 M/UL — SIGNIFICANT CHANGE UP (ref 3.8–5.2)
RBC # FLD: 14.2 % — SIGNIFICANT CHANGE UP (ref 10.3–14.5)
WBC # BLD: 17.36 K/UL — HIGH (ref 3.8–10.5)
WBC # FLD AUTO: 17.36 K/UL — HIGH (ref 3.8–10.5)

## 2022-12-01 RX ORDER — TETANUS TOXOID, REDUCED DIPHTHERIA TOXOID AND ACELLULAR PERTUSSIS VACCINE, ADSORBED 5; 2.5; 8; 8; 2.5 [IU]/.5ML; [IU]/.5ML; UG/.5ML; UG/.5ML; UG/.5ML
0.5 SUSPENSION INTRAMUSCULAR ONCE
Refills: 0 | Status: COMPLETED | OUTPATIENT
Start: 2022-12-01 | End: 2022-12-01

## 2022-12-01 RX ORDER — IBUPROFEN 200 MG
1 TABLET ORAL
Qty: 120 | Refills: 0
Start: 2022-12-01 | End: 2022-12-30

## 2022-12-01 RX ORDER — ETONOGESTREL 68 MG/1
68 IMPLANT SUBCUTANEOUS ONCE
Refills: 0 | Status: COMPLETED | OUTPATIENT
Start: 2022-12-01 | End: 2022-12-01

## 2022-12-01 RX ORDER — IBUPROFEN 200 MG
600 TABLET ORAL EVERY 6 HOURS
Refills: 0 | Status: DISCONTINUED | OUTPATIENT
Start: 2022-12-01 | End: 2022-12-02

## 2022-12-01 RX ORDER — LIDOCAINE HCL 20 MG/ML
4 VIAL (ML) INJECTION ONCE
Refills: 0 | Status: COMPLETED | OUTPATIENT
Start: 2022-12-01 | End: 2022-12-01

## 2022-12-01 RX ORDER — FERROUS SULFATE 325(65) MG
1 TABLET ORAL
Qty: 30 | Refills: 0
Start: 2022-12-01 | End: 2022-12-30

## 2022-12-01 RX ORDER — ACETAMINOPHEN 500 MG
3 TABLET ORAL
Qty: 360 | Refills: 0
Start: 2022-12-01 | End: 2022-12-30

## 2022-12-01 RX ADMIN — INFLUENZA VIRUS VACCINE 0.5 MILLILITER(S): 15; 15; 15; 15 SUSPENSION INTRAMUSCULAR at 05:29

## 2022-12-01 RX ADMIN — Medication 600 MILLIGRAM(S): at 08:29

## 2022-12-01 RX ADMIN — Medication 1 TABLET(S): at 12:23

## 2022-12-01 RX ADMIN — Medication 600 MILLIGRAM(S): at 18:00

## 2022-12-01 RX ADMIN — Medication 600 MILLIGRAM(S): at 02:19

## 2022-12-01 RX ADMIN — SODIUM CHLORIDE 3 MILLILITER(S): 9 INJECTION INTRAMUSCULAR; INTRAVENOUS; SUBCUTANEOUS at 22:47

## 2022-12-01 RX ADMIN — Medication 4 MILLILITER(S): at 13:45

## 2022-12-01 RX ADMIN — Medication 975 MILLIGRAM(S): at 05:18

## 2022-12-01 RX ADMIN — Medication 975 MILLIGRAM(S): at 21:13

## 2022-12-01 RX ADMIN — SODIUM CHLORIDE 3 MILLILITER(S): 9 INJECTION INTRAMUSCULAR; INTRAVENOUS; SUBCUTANEOUS at 14:01

## 2022-12-01 RX ADMIN — Medication 600 MILLIGRAM(S): at 02:55

## 2022-12-01 RX ADMIN — Medication 975 MILLIGRAM(S): at 21:50

## 2022-12-01 RX ADMIN — SODIUM CHLORIDE 3 MILLILITER(S): 9 INJECTION INTRAMUSCULAR; INTRAVENOUS; SUBCUTANEOUS at 05:09

## 2022-12-01 RX ADMIN — Medication 975 MILLIGRAM(S): at 00:10

## 2022-12-01 RX ADMIN — Medication 600 MILLIGRAM(S): at 23:51

## 2022-12-01 RX ADMIN — TETANUS TOXOID, REDUCED DIPHTHERIA TOXOID AND ACELLULAR PERTUSSIS VACCINE, ADSORBED 0.5 MILLILITER(S): 5; 2.5; 8; 8; 2.5 SUSPENSION INTRAMUSCULAR at 05:28

## 2022-12-01 RX ADMIN — ETONOGESTREL 68 MILLIGRAM(S): 68 IMPLANT SUBCUTANEOUS at 13:45

## 2022-12-01 RX ADMIN — Medication 600 MILLIGRAM(S): at 09:01

## 2022-12-01 RX ADMIN — Medication 975 MILLIGRAM(S): at 05:55

## 2022-12-01 RX ADMIN — Medication 600 MILLIGRAM(S): at 17:28

## 2022-12-01 NOTE — DISCHARGE NOTE OB - PATIENT PORTAL LINK FT
You can access the FollowMyHealth Patient Portal offered by [facility] by registering at the following website: [email or web address]. By joining [facility]’s FollowMyHealth portal, you will also be able to view your health information using other applications (apps) compatible with our system.

## 2022-12-01 NOTE — PROVIDER CONTACT NOTE (OTHER) - ACTION/TREATMENT ORDERED:
was told no actions were made while downstairs nor was the resident made aware of the temperature and heart rate. will continue to monitor and communicate with day shift.

## 2022-12-01 NOTE — DISCHARGE NOTE OB - HOSPITAL COURSE
Patient had uncomplicated, nonsurgical vaginal delivery.    QBL: 389  Hct: 40->37.2  Please see delivery note for details.  During postpartum course patient's vitals were stable, vaginal bleeding appropriate, and pain well controlled.  On day of discharge patient was ambulating, her pain controlled with oral medications, had adequate oral intake, and was voiding freely.  Discharge instructions and precautions were given.  Will return to clinic in 6 weeks for postpartum visit.  Postpartum birth control plan is Nexplanon, which was placed before discharge. See procedure note for details.   Patient had uncomplicated, nonsurgical vaginal delivery.    QBL: 389  Hct: 40->37.2  Please see delivery note for details.  During postpartum course patient's vitals were stable, vaginal bleeding appropriate, and pain well controlled.  On day of discharge patient was ambulating, her pain controlled with oral medications, had adequate oral intake, and was voiding freely.  Discharge instructions and precautions were given.  Will return to Dr. Cherry clinic in 6 weeks for postpartum visit.  Postpartum birth control plan is Nexplanon, which was placed before discharge. See procedure note for details.

## 2022-12-01 NOTE — PROGRESS NOTE ADULT - ASSESSMENT
A/P: 21yo PPD#1 s/p  and MLE repair. .  Patient is stable and doing well post-partum.   - Pain well controlled, continue current pain regimen  - Increase ambulation, SCDs when not ambulating  - Labile BPs during pushing but otherwise wnl  - Appropriate decrease in Hct 40->37.3  - Continue regular diet    Rand Russell MD  OB/GYN PGY-1

## 2022-12-01 NOTE — DISCHARGE NOTE OB - CARE PROVIDER_API CALL
Enrrique Cherry  OBSTETRICS AND GYNECOLOGY  [address] [place] [address]  Phone: [phone]  Fax: [phone]  Follow Up Time:

## 2022-12-01 NOTE — PROVIDER CONTACT NOTE (OTHER) - ASSESSMENT
patient is asymptomatic. denies lightheadedness, dizziness, shortness of breath, heart palpitations, or vision changes. only complains of pain.
since coming to PP floor patient has been afebrile and highest HR was 124 during 1st ortho attempt. patient passed her orthos. patient has been asymptomatic.

## 2022-12-01 NOTE — PROVIDER CONTACT NOTE (OTHER) - BACKGROUND
nsd 11/30@1751 qbl 389 midline episiotomy with periurethral tear. in [facility] ortho hr was same  (92 laying to 141 standing)
sin 11/30 @1751 qbl 389 GBS + 11/10 tx with amp x4

## 2022-12-01 NOTE — DISCHARGE NOTE OB - MEDICATION SUMMARY - MEDICATIONS TO TAKE
I will START or STAY ON the medications listed below when I get home from the hospital:    acetaminophen 325 mg oral tablet  -- 3 tab(s) by mouth every 6 hours   -- Indication: For pain    ibuprofen 600 mg oral tablet  -- 1 tab(s) by mouth every 6 hours  -- Indication: For pain    ferrous sulfate 325 mg (65 mg elemental iron) oral tablet  -- 1 tab(s) by mouth once a day   -- Check with your doctor before becoming pregnant.  Do not chew, break, or crush.  May discolor urine or feces.    -- Indication: For postpartum recovery    PNV Prenatal oral tablet  -- 1 tab(s) by mouth once a day  -- Indication: For postpartum recovery

## 2022-12-01 NOTE — DISCHARGE NOTE OB - PLAN OF CARE
After discharge, please stay on pelvic rest for 6 weeks, meaning no sexual intercourse, no tampons and no douching.  No driving for 2 weeks as women can loose a lot of blood during delivery and there is a possibility of being lightheaded/fainting.  No lifting objects heavier than baby for two weeks.  Expect to have vaginal bleeding/spotting for up to six weeks.  The bleeding should get lighter and more white/light brown with time.  For bleeding soaking more than a pad an hour or passing clots greater than the size of your fist, come in to the emergency department.    Follow up with Dr. Cherry in 6 weeks.

## 2022-12-01 NOTE — PROCEDURE NOTE - ADDITIONAL PROCEDURE DETAILS
Pre-operative time out performed. Patient’s name, date of birth and procedure confirmed. The patient’s non-dominant arm was flexed at the elbow and externally rotated so that their hand was underneath their head. The insertion site was identified as overlying the triceps muscle about 8-10 cm from the medial epicondyle of the humerus and 3-5 cm posterior to the sulcus between the biceps and triceps muscles, inserted as far posterior from the sulcus as possible The arm was prepped with chlorhexadine. 4 cc of 1% lidocaine was injected. The skin was punctured with the tip of the needle slightly angled at less 30 degrees, and the needle was inserted until the bevel was just under the skin. The applicator was lowered to a nearly horizontal position and the skin was lifted with the needle while sliding the needle to its full length and tenting the skin upwards. The slider was moved back until it stopped and the implant inserted.  The implant was palpated by the provider and the patient.  Excellent hemostasis noted. Pressure dressing was applied using sterile gauze, with instructions to keep on x 24 hours.  The patient tolerated the procedure well.  EBL: minimal    SN [id number]  EXP [id number]  LOT1 [id number] Pre-operative time out performed. Patient’s name, date of birth and procedure confirmed. The patient’s non-dominant arm was flexed at the elbow and externally rotated so that their hand was underneath their head. The insertion site was identified as overlying the triceps muscle about 8-10 cm from the medial epicondyle of the humerus and 3-5 cm posterior to the sulcus between the biceps and triceps muscles, inserted as far posterior from the sulcus as possible The arm was prepped with chlorhexadine. 4 cc of 1% lidocaine was injected. The skin was punctured with the tip of the needle slightly angled at less 30 degrees, and the needle was inserted until the bevel was just under the skin. The applicator was lowered to a nearly horizontal position and the skin was lifted with the needle while sliding the needle to its full length and tenting the skin upwards. The slider was moved back until it stopped and the implant inserted.  Procedure performed under sterile conditions  The implant was palpated by the provider and the patient.  Excellent hemostasis noted. Pressure dressing was applied using sterile gauze, with instructions to keep on x 24 hours.  The patient tolerated the procedure well.  EBL: minimal    SN [id number]  EXP [id number]  LOT1 [id number]

## 2022-12-01 NOTE — PROGRESS NOTE ADULT - SUBJECTIVE AND OBJECTIVE BOX
OB Progress Note:  PPD#1    S: 23yo  PPD#1 s/p . Patient feels well. Pain is well controlled. She is tolerating clears, has not had anything to eat yet. She is passing flatus. She is voiding spontaneously, and ambulating without difficulty. Denies CP/SOB. lightheadedness/dizziness. N/V. Denies sxs of PEC: denies headache, visual disturbances, RUQ pain, respiratory distress    O:  Vitals:  Vital Signs Last 24 Hrs  T(C): 36.3 (01 Dec 2022 04:45), Max: 38.7 (2022 18:26)  T(F): 97.4 (01 Dec 2022 04:45), Max: 101.7 (2022 18:26)  HR: 89 (01 Dec 2022 04:45) (69 - 141)  BP: 118/57 (01 Dec 2022 04:45) (107/56 - 178/71)  BP(mean): --  RR: 18 (01 Dec 2022 04:45) (16 - 18)  SpO2: 99% (01 Dec 2022 04:45) (89% - 100%)    Parameters below as of 01 Dec 2022 04:45  Patient On (Oxygen Delivery Method): room air        MEDICATIONS  (STANDING):  acetaminophen     Tablet .. 975 milliGRAM(s) Oral <User Schedule>  ibuprofen  Tablet. 600 milliGRAM(s) Oral every 6 hours  prenatal multivitamin 1 Tablet(s) Oral daily  sodium chloride 0.9% lock flush 3 milliLiter(s) IV Push every 8 hours      Labs:  Blood type: O Positive  Rubella IgG: RPR: Negative                          11.9   -- >-----------< --    (  @ 05:50 )             37.3                        13.2   15.46<H> >-----------< 142<L>    (  @ 04:05 )             40.0                  Physical Exam:  General: NAD  Abdomen: soft, non-tender, non-distended, fundus firm  Vaginal: Lochia wnl  Extremities: No erythema/edema

## 2022-12-01 NOTE — DISCHARGE NOTE OB - CARE PLAN
1 Principal Discharge DX:	Vaginal delivery  Assessment and plan of treatment:	After discharge, please stay on pelvic rest for 6 weeks, meaning no sexual intercourse, no tampons and no douching.  No driving for 2 weeks as women can loose a lot of blood during delivery and there is a possibility of being lightheaded/fainting.  No lifting objects heavier than baby for two weeks.  Expect to have vaginal bleeding/spotting for up to six weeks.  The bleeding should get lighter and more white/light brown with time.  For bleeding soaking more than a pad an hour or passing clots greater than the size of your fist, come in to the emergency department.    Follow up with Dr. Cherry in 6 weeks.

## 2022-12-01 NOTE — PROVIDER CONTACT NOTE (OTHER) - SITUATION
patient had a heart rate of 141 at 2023 in [facility] and a temp of 38.7 at 1826. called resident Yesenia Jacobo to check if any measures were taken.
patient HR from laying to standing 96 to 124

## 2022-12-02 VITALS
DIASTOLIC BLOOD PRESSURE: 72 MMHG | OXYGEN SATURATION: 98 % | SYSTOLIC BLOOD PRESSURE: 130 MMHG | RESPIRATION RATE: 18 BRPM | HEART RATE: 78 BPM | TEMPERATURE: 98 F

## 2022-12-02 RX ADMIN — Medication 600 MILLIGRAM(S): at 11:56

## 2022-12-02 RX ADMIN — SODIUM CHLORIDE 3 MILLILITER(S): 9 INJECTION INTRAMUSCULAR; INTRAVENOUS; SUBCUTANEOUS at 06:53

## 2022-12-02 RX ADMIN — Medication 600 MILLIGRAM(S): at 00:21

## 2022-12-02 NOTE — PROCEDURE NOTE - NSPOSTCAREGUIDE_GEN_A_CORE
Verbal/written post procedure instructions were given to patient/caregiver/Instructed patient/caregiver to follow-up with primary care physician/Instructed patient/caregiver regarding signs and symptoms of infection/Keep the cast/splint/dressing clean and dry
Verbal/written post procedure instructions were given to patient/caregiver/Instructed patient/caregiver to follow-up with primary care physician

## 2022-12-02 NOTE — PROGRESS NOTE ADULT - SUBJECTIVE AND OBJECTIVE BOX
OB Progress Note:  PPD#2    S: 21yo PPD#2 s/p . Patient feels well. Pain is well controlled. She is tolerating a regular diet and passing flatus. She is voiding spontaneously, and ambulating without difficulty. Denies CP/SOB. Denies lightheadedness/dizziness N/V. Denies sxs of PEC: denies headache, visual disturbances, RUQ pain, respiratory distress    O:  Vitals:   Vital Signs Last 24 Hrs  T(C): 36.4 (02 Dec 2022 05:17), Max: 36.7 (01 Dec 2022 13:19)  T(F): 97.5 (02 Dec 2022 05:17), Max: 98.1 (01 Dec 2022 20:05)  HR: 81 (02 Dec 2022 05:17) (81 - 98)  BP: 129/71 (02 Dec 2022 05:17) (120/67 - 139/77)  BP(mean): --  RR: 18 (02 Dec 2022 05:17) (18 - 18)  SpO2: 98% (02 Dec 2022 05:17) (98% - 100%)    Parameters below as of 02 Dec 2022 05:17  Patient On (Oxygen Delivery Method): room air        MEDICATIONS  (STANDING):  acetaminophen     Tablet .. 975 milliGRAM(s) Oral <User Schedule>  ibuprofen  Tablet. 600 milliGRAM(s) Oral every 6 hours  prenatal multivitamin 1 Tablet(s) Oral daily  sodium chloride 0.9% lock flush 3 milliLiter(s) IV Push every 8 hours    MEDICATIONS  (PRN):  benzocaine 20%/menthol 0.5% Spray 1 Spray(s) Topical every 6 hours PRN for Perineal discomfort  dibucaine 1% Ointment 1 Application(s) Topical every 6 hours PRN Perineal discomfort  diphenhydrAMINE 25 milliGRAM(s) Oral every 6 hours PRN Pruritus  hydrocortisone 1% Cream 1 Application(s) Topical every 6 hours PRN Moderate Pain (4-6)  lanolin Ointment 1 Application(s) Topical every 6 hours PRN nipple soreness  magnesium hydroxide Suspension 30 milliLiter(s) Oral two times a day PRN Constipation  oxyCODONE    IR 5 milliGRAM(s) Oral every 3 hours PRN Moderate to Severe Pain (4-10)  oxyCODONE    IR 5 milliGRAM(s) Oral once PRN Moderate to Severe Pain (4-10)  pramoxine 1%/zinc 5% Cream 1 Application(s) Topical every 4 hours PRN Moderate Pain (4-6)  simethicone 80 milliGRAM(s) Chew every 4 hours PRN Gas  witch hazel Pads 1 Application(s) Topical every 4 hours PRN Perineal discomfort      Labs:  Blood type: O Positive  Rubella IgG: RPR: Negative                          11.9   17.36<H> >-----------< 121<L>    (  @ 05:50 )             37.3                        13.2   15.46<H> >-----------< 142<L>    (  @ 04:05 )             40.0                  Physical Exam:  General: NAD  Abdomen: soft, non-tender, non-distended, fundus firm  Vaginal: Lochia wnl  Extremities: No erythema/edema OB Progress Note:  PPD#2    S: 23yo PPD#2 s/p . Patient feels well. Pain is well controlled. She is tolerating a regular diet and passing flatus. She is voiding spontaneously, and ambulating without difficulty. Denies CP/SOB. Denies lightheadedness/dizziness N/V. Denies sxs of PEC: denies headache, visual disturbances, RUQ pain, respiratory distress    O:  Vitals:   Vital Signs Last 24 Hrs  T(C): 36.4 (02 Dec 2022 05:17), Max: 36.7 (01 Dec 2022 13:19)  T(F): 97.5 (02 Dec 2022 05:17), Max: 98.1 (01 Dec 2022 20:05)  HR: 81 (02 Dec 2022 05:17) (81 - 98)  BP: 129/71 (02 Dec 2022 05:17) (120/67 - 139/77)  BP(mean): --  RR: 18 (02 Dec 2022 05:17) (18 - 18)  SpO2: 98% (02 Dec 2022 05:17) (98% - 100%)    Parameters below as of 02 Dec 2022 05:17  Patient On (Oxygen Delivery Method): room air        MEDICATIONS  (STANDING):  acetaminophen     Tablet .. 975 milliGRAM(s) Oral <User Schedule>  ibuprofen  Tablet. 600 milliGRAM(s) Oral every 6 hours  prenatal multivitamin 1 Tablet(s) Oral daily  sodium chloride 0.9% lock flush 3 milliLiter(s) IV Push every 8 hours    MEDICATIONS  (PRN):  benzocaine 20%/menthol 0.5% Spray 1 Spray(s) Topical every 6 hours PRN for Perineal discomfort  dibucaine 1% Ointment 1 Application(s) Topical every 6 hours PRN Perineal discomfort  diphenhydrAMINE 25 milliGRAM(s) Oral every 6 hours PRN Pruritus  hydrocortisone 1% Cream 1 Application(s) Topical every 6 hours PRN Moderate Pain (4-6)  lanolin Ointment 1 Application(s) Topical every 6 hours PRN nipple soreness  magnesium hydroxide Suspension 30 milliLiter(s) Oral two times a day PRN Constipation  oxyCODONE    IR 5 milliGRAM(s) Oral every 3 hours PRN Moderate to Severe Pain (4-10)  oxyCODONE    IR 5 milliGRAM(s) Oral once PRN Moderate to Severe Pain (4-10)  pramoxine 1%/zinc 5% Cream 1 Application(s) Topical every 4 hours PRN Moderate Pain (4-6)  simethicone 80 milliGRAM(s) Chew every 4 hours PRN Gas  witch hazel Pads 1 Application(s) Topical every 4 hours PRN Perineal discomfort      Labs:  Blood type: O Positive  Rubella IgG: RPR: Negative                          11.9   17.36<H> >-----------< 121<L>    (  @ 05:50 )             37.3                        13.2   15.46<H> >-----------< 142<L>    (  @ 04:05 )             40.0                  Physical Exam:  General: NAD  Abdomen: soft, non-tender, non-distended, fundus firm  Vaginal: Lochia wnl  Extremities: No erythema, trace edema

## 2022-12-02 NOTE — PROGRESS NOTE ADULT - ATTENDING COMMENTS
Associate Chief of L & D (Late entry)     I have met this patient for the first time today.  She was admitted by DR amina hsu and delivered by Dr Alicea    OB Progress Note:  PPD#1    S: 21yo  PPD#1 s/p . Patient feels well. Pain is well controlled. She is tolerating a regular diet and passing flatus. She is voiding spontaneously, and ambulating without difficulty. Denies CP/SOB. Denies lightheadedness/dizziness. Denies N/V.    O:  Vitals:  Vital Signs Last 24 Hrs  T(C): 36.3 (01 Dec 2022 09:37), Max: 38.7 (2022 18:26)  T(F): 97.4 (01 Dec 2022 09:37), Max: 101.7 (2022 18:26)  HR: 98 (01 Dec 2022 09:37) (77 - 141)  BP: 133/81 (01 Dec 2022 09:37) (118/57 - 178/71)  RR: 18 (01 Dec 2022 09:37) (16 - 18)  SpO2: 99% (01 Dec 2022 09:37) (89% - 100%)    Parameters below as of 01 Dec 2022 09:37  Patient On (Oxygen Delivery Method): room air        MEDICATIONS  (STANDING):  acetaminophen     Tablet .. 975 milliGRAM(s) Oral <User Schedule>  ibuprofen  Tablet. 600 milliGRAM(s) Oral every 6 hours  prenatal multivitamin 1 Tablet(s) Oral daily  sodium chloride 0.9% lock flush 3 milliLiter(s) IV Push every 8 hours      Labs:  Blood type: O Positive  Rubella IgG: RPR: Negative                          11.9   17.36<H> >-----------< 121<L>    (  @ 05:50 )             37.3                        13.2   15.46<H> >-----------< 142<L>    (  @ 04:05 )             40.0        Physical Exam:    Abdomen: soft, non-tender, non-distended, fundus firm  Vaginal: Lochia wnl  Extremities: No erythema/edema    A/P: 21yo PPD#1 s/p  and repair of MLE and left periurethral laceration   - Pain well controlled, continue current pain regimen  - Increase ambulation, SCDs when not ambulating  - Continue regular diet    Shell Link Turner M.D., M.B.A., M.S.
Associate Chief of L & D (Late entry)        OB Progress Note:  PPD#2  S: 21yo  PPD#2 s/p . Patient feels well. Pain is well controlled. She is tolerating a regular diet and passing flatus. She is voiding spontaneously, and ambulating without difficulty. Denies CP/SOB. Denies lightheadedness/dizziness. Denies N/V.    O:  Vital Signs Last 24 Hrs  T(C): 36.4 (02 Dec 2022 05:17), Max: 36.7 (01 Dec 2022 13:19)  T(F): 97.5 (02 Dec 2022 05:17), Max: 98.1 (01 Dec 2022 20:05)  HR: 81 (02 Dec 2022 05:17) (81 - 91)  BP: 129/71 (02 Dec 2022 05:17) (120/67 - 139/77)  RR: 18 (02 Dec 2022 05:17) (18 - 18)  SpO2: 98% (02 Dec 2022 05:17) (98% - 100%)    Parameters below as of 02 Dec 2022 05:17  Patient On (Oxygen Delivery Method): room air            MEDICATIONS  (STANDING):  acetaminophen     Tablet .. 975 milliGRAM(s) Oral <User Schedule>  ibuprofen  Tablet. 600 milliGRAM(s) Oral every 6 hours  prenatal multivitamin 1 Tablet(s) Oral daily  sodium chloride 0.9% lock flush 3 milliLiter(s) IV Push every 8 hours      Labs:  Blood type: O Positive  Rubella IgG: RPR: Negative                          11.9   17.36<H> >-----------< 121<L>    (  @ 05:50 )             37.3                        13.2   15.46<H> >-----------< 142<L>    (  @ 04:05 )             40.0        Physical Exam:    Abdomen: soft, non-tender, non-distended, fundus firm  Vaginal: Lochia wnl  Extremities: No erythema/edema    A/P: 21yo PPD#2 s/p  and repair of MLE and left periurethral laceration   - Patient is stable for discharge and follow up with dr nicola Ward M.D., M.B.A., M.S.

## 2022-12-02 NOTE — PROGRESS NOTE ADULT - ASSESSMENT
A/P: 23yo PPD#1 s/p  and MLE repair. .  Patient is stable and doing well post-partum.   - Pain well controlled, continue current pain regimen  - Increase ambulation, SCDs when not ambulating  - Appropriate decrease in Hct 40->37.3  - Continue regular diet  - BCM: Nexplanon - placed  - Discharge planning    Rand Russell MD  OB/GYN PGY-1 A/P: 23yo PPD#2 s/p  and MLE repair. .  Patient is stable and doing well post-partum.   - Pain well controlled, continue current pain regimen  - Increase ambulation, SCDs when not ambulating  - Appropriate decrease in Hct 40->37.3  - Continue regular diet  - BCM: Nexplanon - placed  - Discharge planning    Rand Russell MD  OB/GYN PGY-1

## 2023-03-03 NOTE — OB PROVIDER TRIAGE NOTE - NS_GBS_INFANT_INVASIVE_OBGYN_ALL_OB_FT
Problem: Discharge Planning  Goal: Discharge to home or other facility with appropriate resources  Outcome: Progressing  Flowsheets (Taken 3/3/2023 0826)  Discharge to home or other facility with appropriate resources:   Identify barriers to discharge with patient and caregiver   Arrange for needed discharge resources and transportation as appropriate   Identify discharge learning needs (meds, wound care, etc)   Refer to discharge planning if patient needs post-hospital services based on physician order or complex needs related to functional status, cognitive ability or social support system     Problem: Pain  Goal: Verbalizes/displays adequate comfort level or baseline comfort level  Outcome: Progressing  Flowsheets (Taken 3/3/2023 0800)  Verbalizes/displays adequate comfort level or baseline comfort level:   Encourage patient to monitor pain and request assistance   Assess pain using appropriate pain scale   Administer analgesics based on type and severity of pain and evaluate response   Implement non-pharmacological measures as appropriate and evaluate response   Consider cultural and social influences on pain and pain management   Notify Licensed Independent Practitioner if interventions unsuccessful or patient reports new pain     Problem: Chronic Conditions and Co-morbidities  Goal: Patient's chronic conditions and co-morbidity symptoms are monitored and maintained or improved  Outcome: Progressing  Flowsheets (Taken 3/3/2023 0826)  Care Plan - Patient's Chronic Conditions and Co-Morbidity Symptoms are Monitored and Maintained or Improved:   Monitor and assess patient's chronic conditions and comorbid symptoms for stability, deterioration, or improvement   Collaborate with multidisciplinary team to address chronic and comorbid conditions and prevent exacerbation or deterioration   Update acute care plan with appropriate goals if chronic or comorbid symptoms are exacerbated and prevent overall improvement and discharge     Problem: Nutrition Deficit:  Goal: Optimize nutritional status  Outcome: Progressing     Problem: Skin/Tissue Integrity  Goal: Absence of new skin breakdown  Description: 1. Monitor for areas of redness and/or skin breakdown  2. Assess vascular access sites hourly  3. Every 4-6 hours minimum:  Change oxygen saturation probe site  4. Every 4-6 hours:  If on nasal continuous positive airway pressure, respiratory therapy assess nares and determine need for appliance change or resting period.   Outcome: Progressing     Problem: Safety - Adult  Goal: Free from fall injury  Outcome: Progressing     Problem: ABCDS Injury Assessment  Goal: Absence of physical injury  Outcome: Progressing Patient states no history

## 2024-02-27 NOTE — OB PROVIDER LABOR PROGRESS NOTE - NS_OBIHIFHRDETAILS_OBGYN_ALL_OB_FT
150bpm, mod variability, +accels, +deceleration noted 150bpm, mod variability, +accels, +spontaneous deceleration noted Cephalic
